# Patient Record
Sex: FEMALE | Race: BLACK OR AFRICAN AMERICAN | Employment: UNEMPLOYED | URBAN - METROPOLITAN AREA
[De-identification: names, ages, dates, MRNs, and addresses within clinical notes are randomized per-mention and may not be internally consistent; named-entity substitution may affect disease eponyms.]

---

## 2017-03-31 ENCOUNTER — HOSPITAL ENCOUNTER (OUTPATIENT)
Dept: VASCULAR LAB | Age: 47
Discharge: HOME | End: 2017-03-31
Attending: INTERNAL MEDICINE | Admitting: INTERNAL MEDICINE

## 2017-03-31 DIAGNOSIS — L03.113 CELLULITIS OF RIGHT UPPER EXTREMITY: ICD-10-CM

## 2017-09-25 ENCOUNTER — HOSPITAL ENCOUNTER (OUTPATIENT)
Dept: MAMMOGRAPHY | Age: 47
Discharge: HOME | End: 2017-09-25
Attending: INTERNAL MEDICINE | Admitting: INTERNAL MEDICINE

## 2017-09-25 DIAGNOSIS — Z12.31 VISIT FOR SCREENING MAMMOGRAM: ICD-10-CM

## 2018-08-22 ENCOUNTER — HOSPITAL ENCOUNTER (EMERGENCY)
Age: 48
Discharge: HOME | End: 2018-08-22
Attending: EMERGENCY MEDICINE
Payer: COMMERCIAL

## 2018-08-22 VITALS
SYSTOLIC BLOOD PRESSURE: 130 MMHG | HEART RATE: 69 BPM | RESPIRATION RATE: 19 BRPM | TEMPERATURE: 98.1 F | DIASTOLIC BLOOD PRESSURE: 69 MMHG | OXYGEN SATURATION: 99 %

## 2018-08-22 DIAGNOSIS — S61.411A LACERATION OF RIGHT HAND WITHOUT FOREIGN BODY, INITIAL ENCOUNTER: Primary | ICD-10-CM

## 2018-08-22 PROCEDURE — 4500000022 HC ED LEVEL 2 PROCEDURE

## 2018-08-22 PROCEDURE — 99282 EMERGENCY DEPT VISIT SF MDM: CPT

## 2018-08-22 RX ORDER — GINSENG 100 MG
CAPSULE ORAL
Status: DISCONTINUED
Start: 2018-08-22 | End: 2018-08-22 | Stop reason: HOSPADM

## 2018-08-22 RX ORDER — BACITRACIN ZINC AND POLYMYXIN B SULFATE 500; 1000 [USP'U]/G; [USP'U]/G
OINTMENT TOPICAL ONCE
Status: DISCONTINUED | OUTPATIENT
Start: 2018-08-22 | End: 2018-08-22 | Stop reason: HOSPADM

## 2018-08-22 RX ORDER — LIDOCAINE HYDROCHLORIDE AND EPINEPHRINE 10; 10 MG/ML; UG/ML
20 INJECTION, SOLUTION INFILTRATION; PERINEURAL ONCE
Status: DISCONTINUED | OUTPATIENT
Start: 2018-08-22 | End: 2018-08-22 | Stop reason: HOSPADM

## 2018-08-22 ASSESSMENT — PAIN SCALES - GENERAL: PAINLEVEL_OUTOF10: 5

## 2018-08-22 ASSESSMENT — ENCOUNTER SYMPTOMS
VOMITING: 0
COLOR CHANGE: 0
NAUSEA: 0

## 2018-08-22 NOTE — ED PROVIDER NOTES
[address] ENCOUNTER          PHYSICIAN ASSISTANT NOTE       Date of evaluation: 8/22/2018    Chief Complaint     Laceration      History of Present Illness     Ronni Kocher is a 46 y.o. female who presents Complaining of a laceration to the right hand. Patient was doing a knife demonstration at home and knife sliced her palmar surface of her right hand. This was a clean new knife. She denies any other injury. She is right-hand dominant. She denies any decreased range of motion, rotation of pain, paresthesias, or weakness of the right upper extremity. She does complain of bleeding from the wound. She states last tetanus shot is within the last 5-10 years, she thinks in 2016. Review of Systems     Review of Systems   Constitutional: Negative for chills, fatigue and fever. Gastrointestinal: Negative for nausea and vomiting. Skin: Positive for wound. Negative for color change. Neurological: Negative for weakness and numbness. Past Medical, Surgical, Family, and Social History     She has no past medical history on file. She has no past surgical history on file. Her family history is not on file. She reports that she has never smoked. She has never used smokeless tobacco. She reports that she drinks alcohol. She reports that she does not use drugs. Medications     Previous Medications    DAPSONE 5 % GEL    Apply topically BID    HYDROQUINONE 4 % CREAM    Apply topically 2 times daily. Allergies     She has No Known Allergies. Physical Exam     INITIAL VITALS: BP: 130/69, Temp: 98.1 °F (36.7 °C), Pulse: 69, Resp: 19, SpO2: 99 %   Physical Exam   Constitutional: She appears well-developed and well-nourished. No distress. HENT:   Head: Normocephalic and atraumatic. Cardiovascular: Intact distal pulses. Musculoskeletal: Normal range of motion. She exhibits tenderness. She exhibits no edema or deformity.         Right hand: She exhibits tenderness and laceration. She exhibits normal range of motion, no bony tenderness, normal two-point discrimination, no deformity and no swelling. Normal sensation noted. Decreased sensation is not present in the ulnar distribution, is not present in the medial distribution and is not present in the radial distribution. Normal strength noted. She exhibits no finger abduction, no thumb/finger opposition and no wrist extension trouble. Hands:  Patient has 3 cm laceration to the palmar surface of the right hand over the fifth metacarpal.  Patient was bleeding is controlled. 2+ radial pulse right upper extremity. Full range of motion of right hand and digits. Patient does have full flexion and extension of the DIP, PIP and MCP joint of all 5 digits of the right hand. Neurological: She has normal strength. No sensory deficit. Skin: Skin is warm and dry. She is not diaphoretic. No erythema. Vitals reviewed. Diagnostic Results     RECENT VITALS:  BP: 130/69, Temp: 98.1 °F (36.7 °C), Pulse: 69, Resp: 19, SpO2: 99 %     Procedures     Laceration Repair Procedure Note      Indication: Laceration  Location: right hand    Procedure: The patient was placed in the appropriate position and anesthesia around the laceration was obtained by infiltration using 1% lidocaine with epinephrine. The area was then irrigated with normal saline. No foreign bodies and no tendon involvement noted in the wound. The patient prepped and draped in a sterile fashion. The laceration was closed with 4. 0 Ethilon simple interrupted sutures. There were no additional lacerations requiring repair. The wound area was then dressed with a bandage. Total repaired wound length: 3 cm. Other Items: Suture count: 6    The patient tolerated the procedure well. Complications: None      ED Course     Nursing Notes, Past Medical Hx, Past Surgical Hx, Social Hx, Allergies, and Family Hx were reviewed.     The patient was given the following

## 2018-08-22 NOTE — ED PROVIDER NOTES
ED Attending Attestation Note     Date of evaluation: 8/22/2018    This patient was seen by the advance practice provider. I have seen and examined the patient, agree with the workup, evaluation, management and diagnosis. The care plan has been discussed. My assessment reveals a laceration on the hypothenar eminence with sensation preserved distal to the site of injury.      I was present for key portions of the laceration repair as performed by the DEANDRE    Neo Avelar MD  08/22/18 [address], [place]  08/22/18 [id number]

## 2018-08-22 NOTE — ED NOTES
Patient reports lac to right palm from a knife this afternoon. Patient reports she was testing a knife when she cut herself. Bleeding controlled on arrival to ED, pain 5/10.       Jose Santizo RN  08/22/18 [id number]

## 2020-10-01 ENCOUNTER — HOSPITAL ENCOUNTER (OUTPATIENT)
Dept: MAMMOGRAPHY | Age: 50
Discharge: HOME | End: 2020-10-01
Payer: COMMERCIAL

## 2020-10-01 PROCEDURE — 77063 BREAST TOMOSYNTHESIS BI: CPT

## 2022-06-16 ENCOUNTER — HOSPITAL ENCOUNTER (OUTPATIENT)
Dept: WOMENS IMAGING | Age: 52
Discharge: HOME | End: 2022-06-16
Payer: COMMERCIAL

## 2022-06-16 DIAGNOSIS — Z12.31 VISIT FOR SCREENING MAMMOGRAM: ICD-10-CM

## 2022-06-16 PROCEDURE — 77063 BREAST TOMOSYNTHESIS BI: CPT

## 2024-01-31 ENCOUNTER — HOSPITAL ENCOUNTER (OUTPATIENT)
Dept: MAMMOGRAPHY | Age: 54
Discharge: HOME | End: 2024-01-31
Payer: COMMERCIAL

## 2024-01-31 VITALS — WEIGHT: 185 LBS | BODY MASS INDEX: 31.58 KG/M2 | HEIGHT: 64 IN

## 2024-01-31 DIAGNOSIS — Z12.31 VISIT FOR SCREENING MAMMOGRAM: ICD-10-CM

## 2024-01-31 PROCEDURE — 77063 BREAST TOMOSYNTHESIS BI: CPT

## 2025-02-11 ENCOUNTER — HOSPITAL ENCOUNTER (OUTPATIENT)
Dept: MAMMOGRAPHY | Age: 55
Discharge: HOME | End: 2025-02-11
Payer: COMMERCIAL

## 2025-02-11 VITALS — HEIGHT: 64 IN | WEIGHT: 195 LBS | BODY MASS INDEX: 33.29 KG/M2

## 2025-02-11 DIAGNOSIS — Z12.31 VISIT FOR SCREENING MAMMOGRAM: ICD-10-CM

## 2025-02-11 PROCEDURE — 77063 BREAST TOMOSYNTHESIS BI: CPT
